# Patient Record
Sex: FEMALE | Race: WHITE | NOT HISPANIC OR LATINO
[De-identification: names, ages, dates, MRNs, and addresses within clinical notes are randomized per-mention and may not be internally consistent; named-entity substitution may affect disease eponyms.]

---

## 2020-12-01 ENCOUNTER — APPOINTMENT (OUTPATIENT)
Dept: PLASTIC SURGERY | Facility: CLINIC | Age: 25
End: 2020-12-01
Payer: COMMERCIAL

## 2020-12-01 ENCOUNTER — TRANSCRIPTION ENCOUNTER (OUTPATIENT)
Age: 25
End: 2020-12-01

## 2020-12-01 VITALS — HEIGHT: 62 IN | BODY MASS INDEX: 21.53 KG/M2 | OXYGEN SATURATION: 98 % | HEART RATE: 94 BPM | WEIGHT: 117 LBS

## 2020-12-01 DIAGNOSIS — N62 HYPERTROPHY OF BREAST: ICD-10-CM

## 2020-12-01 DIAGNOSIS — M54.9 DORSALGIA, UNSPECIFIED: ICD-10-CM

## 2020-12-01 DIAGNOSIS — M54.2 CERVICALGIA: ICD-10-CM

## 2020-12-01 PROCEDURE — 99203 OFFICE O/P NEW LOW 30 MIN: CPT

## 2020-12-01 PROCEDURE — 99072 ADDL SUPL MATRL&STAF TM PHE: CPT

## 2020-12-01 NOTE — HISTORY OF PRESENT ILLNESS
[FreeTextEntry1] : 26 y/o F referred by Dr. Chaudhary presents for initial evaluation to discuss b/l breast reduction. She c/o chronic neck and back pain for which she sees physical therapy and uses OTC NSAIDs. She states she has a hx of postural issues including lordosis and kyphosis. No personal past breast issues. Her maternal grandmother - in her 40s. She currently wears a 32D size bra. She reports minimal sensation to b/l nipples.

## 2020-12-01 NOTE — PHYSICAL EXAM
[Bra Size: _______] : Bra Size: [unfilled] [de-identified] : b/l mild ptosis and macromastia, no scar, no masses, no nipple discharge. SN - N: bilateral 23 cm, BD: bilateral 12 cm, grade I ptosis. right areola 55mm x 48 mm

## 2020-12-01 NOTE — ASSESSMENT
[FreeTextEntry1] : I reviewed with the patient in detail the risks, benefits, and alternatives of bilateral reduction mammoplasty. I explained to her that the goals of the operation are to reduce the size of the breast mounds and to tighten the skin envelope and raise the nipple areola complex. I explained the scar burden associated with this operation and I showed her pictures. I explained the risk of bleeding, infection, delayed wound healing, residual asymmetry, decreased sensation to the nipple areola complex, loss of the nipple areola complex, suboptimal scarring and need for revision surgery. The surgery is approximately 3.5 hours and the recovery is approximately 2 weeks - avoiding any strenuous physical activity with 1-2 weeks out of work. Discussed short scar breast reduction.\par \par She was with her mother throughout the consultation, She understands that she will have a lack of superior pole fullness and she verbalized that she wants less upper pole fullness not even more - she even asked if she can have tissue removed from the upper pole to have a more "bony" appearance and I explained that I wouldn't recommend that or do that and that a standard vertical style reduction would give her a good result - I explained the scar burdan associated with a vertical scar and possible small transverse scar in the IMF\par

## 2021-02-04 ENCOUNTER — APPOINTMENT (OUTPATIENT)
Dept: MRI IMAGING | Facility: CLINIC | Age: 26
End: 2021-02-04

## 2021-02-12 ENCOUNTER — APPOINTMENT (OUTPATIENT)
Dept: MRI IMAGING | Facility: CLINIC | Age: 26
End: 2021-02-12
Payer: COMMERCIAL

## 2021-02-12 ENCOUNTER — OUTPATIENT (OUTPATIENT)
Dept: OUTPATIENT SERVICES | Facility: HOSPITAL | Age: 26
LOS: 1 days | End: 2021-02-12
Payer: COMMERCIAL

## 2021-02-12 DIAGNOSIS — Z00.8 ENCOUNTER FOR OTHER GENERAL EXAMINATION: ICD-10-CM

## 2021-02-12 PROCEDURE — 72141 MRI NECK SPINE W/O DYE: CPT | Mod: 26

## 2021-02-12 PROCEDURE — 72141 MRI NECK SPINE W/O DYE: CPT

## 2021-03-10 ENCOUNTER — RESULT REVIEW (OUTPATIENT)
Age: 26
End: 2021-03-10

## 2022-07-11 NOTE — REASON FOR VISIT
[Initial Evaluation] : an initial evaluation [FreeTextEntry1] : Dr. Chaudhary 4 = No assist / stand by assistance